# Patient Record
Sex: FEMALE | Race: WHITE
[De-identification: names, ages, dates, MRNs, and addresses within clinical notes are randomized per-mention and may not be internally consistent; named-entity substitution may affect disease eponyms.]

---

## 2017-01-26 ENCOUNTER — HOSPITAL ENCOUNTER (OUTPATIENT)
Dept: HOSPITAL 45 - C.LAB1850 | Age: 62
Discharge: HOME | End: 2017-01-26
Attending: INTERNAL MEDICINE
Payer: COMMERCIAL

## 2017-01-26 DIAGNOSIS — I48.0: Primary | ICD-10-CM

## 2017-01-26 LAB
INR PPP: 2 (ref 0.9–1.1)
PROTHROMBIN TIME: 22.1 SECONDS (ref 9–12)

## 2017-09-29 ENCOUNTER — HOSPITAL ENCOUNTER (OUTPATIENT)
Dept: HOSPITAL 45 - C.LAB1850 | Age: 62
Discharge: HOME | End: 2017-09-29
Attending: INTERNAL MEDICINE
Payer: COMMERCIAL

## 2017-09-29 DIAGNOSIS — I10: ICD-10-CM

## 2017-09-29 DIAGNOSIS — I48.91: ICD-10-CM

## 2017-09-29 DIAGNOSIS — E78.00: Primary | ICD-10-CM

## 2017-09-29 LAB
ALT SERPL-CCNC: 20 U/L (ref 12–78)
AST SERPL-CCNC: 14 U/L (ref 15–37)
CHOLEST/HDLC SERPL: 3.4 {RATIO}
GLUCOSE UR QL: 38 MG/DL
KETONES UR QL STRIP: 66 MG/DL
NITRITE UR QL STRIP: 136 MG/DL (ref 0–150)
PH UR: 131 MG/DL (ref 0–200)
VERY LOW DENSITY LIPOPROT CALC: 27 MG/DL

## 2017-10-09 ENCOUNTER — HOSPITAL ENCOUNTER (OUTPATIENT)
Dept: HOSPITAL 45 - C.MAMM | Age: 62
Discharge: HOME | End: 2017-10-09
Attending: PHYSICIAN ASSISTANT
Payer: COMMERCIAL

## 2017-10-09 DIAGNOSIS — Z12.31: Primary | ICD-10-CM

## 2017-10-10 NOTE — MAMMOGRAPHY REPORT
BILATERAL DIGITAL SCREENING MAMMOGRAM TOMOSYNTHESIS WITH CAD: 10/9/2017

CLINICAL HISTORY: Routine screening.  Patient has no complaints.  





TECHNIQUE:  Breast tomosynthesis in addition to standard 2D mammography was performed. Current study 
was also evaluated with a Computer Aided Detection (CAD) system.  



COMPARISON: Comparison is made to exams dated:  10/6/2016 mammogram, 2/4/2013 mammogram, 1/26/2012 ma
mmogram - [facility], 9/29/2009, and 9/21/2009.   



BREAST COMPOSITION:  There are scattered areas of fibroglandular density in both breasts.  



FINDINGS: There are scattered benign rim and punctate calcifications in the breasts.  Stable intramam
yohannes lymph node in the right upper outer quadrant.  No suspicious mass, architectural distortion or c
luster of microcalcifications is seen.  



IMPRESSION:  ACR BI-RADS CATEGORY 1: NEGATIVE

There is no mammographic evidence of malignancy. A 1 year screening mammogram is recommended.  The pa
tient will receive written notification of the results.  





Approximately 10% of breast cancers are not detected with mammography. A negative mammographic report
 should not delay biopsy if a clinically suggestive mass is present.



Hannah Zaldivar M.D.          

ay/:10/9/2017 16:09:45  



Imaging Technologist: Susan PAIGE(R)([place]), [facility]

letter sent: Normal 1/2  

BI-RADS Code: ACR BI-RADS Category 1: Negative

## 2017-10-13 ENCOUNTER — HOSPITAL ENCOUNTER (OUTPATIENT)
Dept: HOSPITAL 45 - C.LAB1850 | Age: 62
Discharge: HOME | End: 2017-10-13
Attending: INTERNAL MEDICINE
Payer: COMMERCIAL

## 2017-10-13 DIAGNOSIS — I48.91: Primary | ICD-10-CM

## 2017-10-13 LAB
INR PPP: 2.7 (ref 0.9–1.1)
PROTHROMBIN TIME: 30.5 SECONDS (ref 9–12)

## 2017-12-19 ENCOUNTER — HOSPITAL ENCOUNTER (OUTPATIENT)
Dept: HOSPITAL 45 - C.LAB1850 | Age: 62
Discharge: HOME | End: 2017-12-19
Attending: INTERNAL MEDICINE
Payer: COMMERCIAL

## 2017-12-19 DIAGNOSIS — I48.91: Primary | ICD-10-CM

## 2017-12-19 LAB
INR PPP: 2.6 (ref 0.9–1.1)
PROTHROMBIN TIME: 26.3 SECONDS (ref 9–12)

## 2018-01-31 ENCOUNTER — HOSPITAL ENCOUNTER (OUTPATIENT)
Dept: HOSPITAL 45 - C.LAB1850 | Age: 63
Discharge: HOME | End: 2018-01-31
Attending: INTERNAL MEDICINE
Payer: COMMERCIAL

## 2018-01-31 DIAGNOSIS — I48.91: Primary | ICD-10-CM

## 2018-01-31 LAB — INR PPP: 2.7 (ref 0.9–1.1)

## 2018-03-27 ENCOUNTER — HOSPITAL ENCOUNTER (OUTPATIENT)
Dept: HOSPITAL 45 - C.LAB1850 | Age: 63
Discharge: HOME | End: 2018-03-27
Attending: INTERNAL MEDICINE
Payer: COMMERCIAL

## 2018-03-27 DIAGNOSIS — I10: ICD-10-CM

## 2018-03-27 DIAGNOSIS — E78.00: Primary | ICD-10-CM

## 2018-03-27 LAB
ALT SERPL-CCNC: 21 U/L (ref 12–78)
AST SERPL-CCNC: 15 U/L (ref 15–37)
KETONES UR QL STRIP: 85 MG/DL
PH UR: 146 MG/DL (ref 0–200)